# Patient Record
Sex: FEMALE | Race: WHITE
[De-identification: names, ages, dates, MRNs, and addresses within clinical notes are randomized per-mention and may not be internally consistent; named-entity substitution may affect disease eponyms.]

---

## 2017-06-23 ENCOUNTER — HOSPITAL ENCOUNTER (OUTPATIENT)
Dept: HOSPITAL 11 - JP.SDS | Age: 62
Discharge: HOME | End: 2017-06-23
Attending: SURGERY
Payer: COMMERCIAL

## 2017-06-23 VITALS — SYSTOLIC BLOOD PRESSURE: 103 MMHG | DIASTOLIC BLOOD PRESSURE: 70 MMHG

## 2017-06-23 DIAGNOSIS — I10: ICD-10-CM

## 2017-06-23 DIAGNOSIS — K21.9: ICD-10-CM

## 2017-06-23 DIAGNOSIS — F41.9: ICD-10-CM

## 2017-06-23 DIAGNOSIS — Z91.040: ICD-10-CM

## 2017-06-23 DIAGNOSIS — F32.9: ICD-10-CM

## 2017-06-23 DIAGNOSIS — R13.13: Primary | ICD-10-CM

## 2017-06-23 DIAGNOSIS — E66.9: ICD-10-CM

## 2017-06-23 DIAGNOSIS — Z98.84: ICD-10-CM

## 2017-06-23 DIAGNOSIS — Z88.8: ICD-10-CM

## 2017-06-23 DIAGNOSIS — Z91.018: ICD-10-CM

## 2017-06-23 DIAGNOSIS — J45.909: ICD-10-CM

## 2017-06-23 DIAGNOSIS — K28.9: ICD-10-CM

## 2017-06-23 PROCEDURE — 43239 EGD BIOPSY SINGLE/MULTIPLE: CPT

## 2017-06-23 PROCEDURE — 87081 CULTURE SCREEN ONLY: CPT

## 2017-06-26 NOTE — OR
DATE OF PROCEDURE:  06/23/2017

 

PREOPERATIVE DIAGNOSIS:  Laryngopharyngeal dysphagia.

 

POSTOPERATIVE DIAGNOSES:

1. Laryngopharyngeal dysphagia with no significant inflammation involving the pharynx,

    larynx, or upper esophageal sphincter.

2. Small marginal ulcer.

 

OPERATIVE PROCEDURE:  Upper GI endoscopy with biopsies of gastric pouch for CLOtest.

 

ANESTHESIA:  IV sedation.

 

INDICATION FOR PROCEDURE:  This is a 61-year-old status post Jhony-en-Y gastric bypass in

June 2011.  She presents now with some dysphagia which she points to being in her neck and

uppermost chest area.  The plan is to proceed with upper GI endoscopy with biopsies and/or

dilation as indicated.  Potential risks including bleeding and perforation were discussed,

and the patient wishes to proceed.

 

DETAILS OF PROCEDURE:  The patient was taken to the operating room and placed in a left

lateral decubitus position.  IV sedation was administered, after which the upper GI

endoscope was passed orally through the length of the esophagus into the gastric pouch, from

there through the gastrojejunostomy roughly 20 cm into the Jhony limb.

 

FINDINGS:  Included a normal hypopharynx, larynx, upper esophageal sphincter, and esophageal

body at the EG junction.  No significant abnormalities were noted.  She had a very small

pouch.  Just at the edge of the pouch distally, there was a small marginal ulcer with some

white plaque over that consistent with some fibrinous exudate.  No bleeding was seen.  There

was no stricturing and relatively little array of edema otherwise at the gastrojejunostomy.

The visualized portion of the Jhony limb was unremarkable.  Biopsies were then obtained from

the gastric pouch, sent for CLOtest for H. pylori.  Minimal bleeding from the biopsy sites

was seen and the procedure then concluded.

 

The patient is already on omeprazole 40 mg a day.  Given this, I think we will try her on

some liquid Carafate 500 mg q.i.d.  It would need to be liquid as a pill form would pass

beyond the targeted area, i.e. the area around the gastric pouch prior to it dissolving.

 

The patient's other problem at this point from an abdominal standpoint is quite a bit in the

way of bloating.  She has a fair bit of postprandial bloating especially.  This would be

suggestive of partial small bowel obstruction and the plan will be to obtain a CT scan of

the abdomen and pelvis on Wednesday 07/05 (I am out of town next week), and we will see her

that

same day to see if there is any indication for diagnostic laparoscopy with possible lysis of

adhesions and/or correction of  a partial small bowel obstruction per se.

 

 

 

 

Camden Birmingham MD

DD:  06/23/2017 11:29:38

DT:  06/23/2017 14:17:56

Job #:  [id number]

## 2018-06-08 ENCOUNTER — HOSPITAL ENCOUNTER (OUTPATIENT)
Dept: HOSPITAL 11 - JP.SDS | Age: 63
Discharge: HOME | End: 2018-06-08
Attending: SURGERY
Payer: COMMERCIAL

## 2018-06-08 VITALS — SYSTOLIC BLOOD PRESSURE: 123 MMHG | DIASTOLIC BLOOD PRESSURE: 72 MMHG

## 2018-06-08 DIAGNOSIS — Z88.5: ICD-10-CM

## 2018-06-08 DIAGNOSIS — R13.10: Primary | ICD-10-CM

## 2018-06-08 DIAGNOSIS — Z91.040: ICD-10-CM

## 2018-06-08 DIAGNOSIS — Z88.6: ICD-10-CM

## 2018-06-08 DIAGNOSIS — Z91.030: ICD-10-CM

## 2018-06-08 DIAGNOSIS — Z91.048: ICD-10-CM

## 2018-06-08 DIAGNOSIS — Z88.8: ICD-10-CM

## 2018-06-08 DIAGNOSIS — Z91.018: ICD-10-CM

## 2018-06-08 PROCEDURE — 43245 EGD DILATE STRICTURE: CPT

## 2018-06-12 NOTE — OR
DATE OF PROCEDURE:  06/08/2018

 

PREOPERATIVE DIAGNOSIS:  Dysphagia, status post Jhony-en-Y gastric bypass.

 

POSTOPERATIVE DIAGNOSIS:  Normal upper GI endoscopic examination, status post Jhony-en-Y

gastric bypass.

 

OPERATIVE PROCEDURE:  Upper GI endoscopy with dilation of gastrojejunostomy, (minimal change

seen after dilation), (38503).

 

ANESTHESIA:  IV sedation.

 

INDICATIONS FOR PROCEDURE:  This is a 62-year-old, status post previous Jhony-en-Y gastric

bypass presenting with some dysphagia referable to the area of the gastrojejunostomy.  The

plan was to proceed with upper GI endoscopy with biopsies and/or dilation as indicated.

Potential risks including bleeding and perforation were discussed, and the patient wishes to

proceed.

 

DESCRIPTION OF PROCEDURE:  The patient was taken to the operating room and placed on the

left lateral decubitus position.  IV sedation was administered after which the upper GI

endoscope was passed orally through the length of the esophagus into the gastric pouch and

from there through the gastrojejunostomy roughly 20 cm into the Jhony limb.  Overall, the

examination at this point was entirely normal.  There were no areas of inflammation or

stricturing.  The gastrojejunostomy was widely opened.  We did deploy a 54-Spanish balloon

dilator which is a largest size available and this resulted in essentially no additional

luminal dimension as this was already quite wide opened.  At that point, the scope was

withdrawn and the procedure then concluded.

 

The patient may be having some esophageal spasm and recently been using Levsin 0.125 mg

sublingually p.r.n. for these symptoms with some success and she was given a prescription

for that to be following with [facility] Du in 1 month and if she has additional GI problems

in the interim, she was instructed to contact either our program in [place] or Dr. Rosales in [place].

 

 

 

 

Camden Birmingham MD

DD:  06/12/2018 07:00:15

DT:  06/12/2018 07:44:25

Job #:  [id number]

## 2023-08-07 ENCOUNTER — HOSPITAL ENCOUNTER (OUTPATIENT)
Dept: HOSPITAL 11 - JP.SDS | Age: 68
Discharge: HOME | End: 2023-08-07
Attending: SURGERY
Payer: MEDICARE

## 2023-08-07 VITALS — SYSTOLIC BLOOD PRESSURE: 130 MMHG | HEART RATE: 63 BPM | DIASTOLIC BLOOD PRESSURE: 55 MMHG

## 2023-08-07 DIAGNOSIS — Z88.5: ICD-10-CM

## 2023-08-07 DIAGNOSIS — R14.0: Primary | ICD-10-CM

## 2023-08-07 DIAGNOSIS — R10.13: ICD-10-CM

## 2023-08-07 DIAGNOSIS — Z98.84: ICD-10-CM

## 2023-08-07 DIAGNOSIS — Z88.8: ICD-10-CM

## 2023-08-07 DIAGNOSIS — Z91.040: ICD-10-CM

## 2023-08-07 PROCEDURE — 43235 EGD DIAGNOSTIC BRUSH WASH: CPT

## 2023-08-15 ENCOUNTER — HOSPITAL ENCOUNTER (INPATIENT)
Dept: HOSPITAL 11 - JP.SDSSCHI | Age: 68
LOS: 3 days | Discharge: HOME | DRG: 327 | End: 2023-08-18
Attending: SURGERY | Admitting: SURGERY
Payer: MEDICARE

## 2023-08-15 DIAGNOSIS — Z88.5: ICD-10-CM

## 2023-08-15 DIAGNOSIS — K21.9: ICD-10-CM

## 2023-08-15 DIAGNOSIS — K56.51: Primary | ICD-10-CM

## 2023-08-15 DIAGNOSIS — R79.89: ICD-10-CM

## 2023-08-15 DIAGNOSIS — Z88.6: ICD-10-CM

## 2023-08-15 DIAGNOSIS — Z79.899: ICD-10-CM

## 2023-08-15 DIAGNOSIS — Z20.822: ICD-10-CM

## 2023-08-15 DIAGNOSIS — Z91.030: ICD-10-CM

## 2023-08-15 DIAGNOSIS — G47.33: ICD-10-CM

## 2023-08-15 DIAGNOSIS — C64.2: ICD-10-CM

## 2023-08-15 DIAGNOSIS — Z90.49: ICD-10-CM

## 2023-08-15 DIAGNOSIS — G43.909: ICD-10-CM

## 2023-08-15 DIAGNOSIS — J45.909: ICD-10-CM

## 2023-08-15 DIAGNOSIS — Z87.19: ICD-10-CM

## 2023-08-15 DIAGNOSIS — Z91.09: ICD-10-CM

## 2023-08-15 DIAGNOSIS — D17.5: ICD-10-CM

## 2023-08-15 DIAGNOSIS — N18.31: ICD-10-CM

## 2023-08-15 DIAGNOSIS — Z96.653: ICD-10-CM

## 2023-08-15 DIAGNOSIS — Z98.49: ICD-10-CM

## 2023-08-15 DIAGNOSIS — E78.00: ICD-10-CM

## 2023-08-15 DIAGNOSIS — I12.9: ICD-10-CM

## 2023-08-15 DIAGNOSIS — F32.9: ICD-10-CM

## 2023-08-15 DIAGNOSIS — Z99.81: ICD-10-CM

## 2023-08-15 DIAGNOSIS — N28.1: ICD-10-CM

## 2023-08-15 DIAGNOSIS — Z97.8: ICD-10-CM

## 2023-08-15 DIAGNOSIS — Z79.82: ICD-10-CM

## 2023-08-15 DIAGNOSIS — Z98.84: ICD-10-CM

## 2023-08-15 DIAGNOSIS — Z91.040: ICD-10-CM

## 2023-08-15 DIAGNOSIS — Z47.89: ICD-10-CM

## 2023-08-15 DIAGNOSIS — E11.65: ICD-10-CM

## 2023-08-15 DIAGNOSIS — E11.40: ICD-10-CM

## 2023-08-15 DIAGNOSIS — Z87.891: ICD-10-CM

## 2023-08-15 DIAGNOSIS — C64.1: ICD-10-CM

## 2023-08-15 DIAGNOSIS — D63.1: ICD-10-CM

## 2023-08-15 DIAGNOSIS — E53.8: ICD-10-CM

## 2023-08-15 DIAGNOSIS — G47.00: ICD-10-CM

## 2023-08-15 DIAGNOSIS — Z91.02: ICD-10-CM

## 2023-08-15 PROCEDURE — C9113 INJ PANTOPRAZOLE SODIUM, VIA: HCPCS

## 2023-08-15 PROCEDURE — 3E0M05Z INTRODUCTION OF ADHESION BARRIER INTO PERITONEAL CAVITY, OPEN APPROACH: ICD-10-PCS | Performed by: SURGERY

## 2023-08-15 PROCEDURE — 0D160ZA BYPASS STOMACH TO JEJUNUM, OPEN APPROACH: ICD-10-PCS | Performed by: SURGERY

## 2023-08-15 PROCEDURE — 0WBF0ZX EXCISION OF ABDOMINAL WALL, OPEN APPROACH, DIAGNOSTIC: ICD-10-PCS | Performed by: SURGERY

## 2023-08-15 PROCEDURE — 0DQ80ZZ REPAIR SMALL INTESTINE, OPEN APPROACH: ICD-10-PCS | Performed by: SURGERY

## 2023-08-15 PROCEDURE — U0002 COVID-19 LAB TEST NON-CDC: HCPCS

## 2023-08-15 PROCEDURE — 5A09357 ASSISTANCE WITH RESPIRATORY VENTILATION, LESS THAN 24 CONSECUTIVE HOURS, CONTINUOUS POSITIVE AIRWAY PRESSURE: ICD-10-PCS | Performed by: SURGERY

## 2023-08-15 RX ADMIN — Medication PRN MG: at 11:12

## 2023-08-15 RX ADMIN — INSULIN LISPRO SCH: 100 INJECTION, SOLUTION INTRAVENOUS; SUBCUTANEOUS at 16:03

## 2023-08-15 RX ADMIN — SODIUM CHLORIDE, SODIUM LACTATE, POTASSIUM CHLORIDE, AND CALCIUM CHLORIDE SCH MLS/HR: .6; .31; .03; .02 INJECTION, SOLUTION INTRAVENOUS at 18:40

## 2023-08-15 RX ADMIN — INSULIN LISPRO SCH: 100 INJECTION, SOLUTION INTRAVENOUS; SUBCUTANEOUS at 22:07

## 2023-08-15 RX ADMIN — SODIUM FERRIC GLUCONATE COMPLEX SCH MLS/HR: 12.5 INJECTION INTRAVENOUS at 14:02

## 2023-08-16 LAB
ALBUMIN SERPL-MCNC: 2.6 G/DL (ref 3.4–5)
ALBUMIN/GLOB SERPL: 1 {RATIO} (ref 1.2–2.2)
ALP SERPL-CCNC: 176 U/L (ref 46–116)
ALT SERPL-CCNC: 28 U/L (ref 12–78)
ANION GAP SERPL CALC-SCNC: 6.7 MMOL/L (ref 5–14)
AST SERPL-CCNC: 21 U/L (ref 15–37)
BASOPHILS # BLD AUTO: 0.02 K/UL (ref 0–0.1)
BASOPHILS NFR BLD AUTO: 0.1 % (ref 0.1–1.3)
BILIRUB SERPL-MCNC: 0.4 MG/DL (ref 0.2–1)
BNP SERPL-MCNC: 635 PG/ML (ref 5–125)
BUN SERPL-MCNC: 14 MG/DL (ref 7–18)
CALCIUM SERPL-MCNC: 8.1 MG/DL (ref 8.5–10.1)
CHLORIDE SERPL-SCNC: 106 MMOL/L (ref 100–108)
CO2 SERPL-SCNC: 27 MMOL/L (ref 21–32)
CREAT CL 24H UR+SERPL-VRATE: 37.6 ML/MIN
CREAT SERPL-MCNC: 1 MG/DL (ref 0.6–1)
EOSINOPHIL # BLD AUTO: 0 K/UL (ref 0–0.4)
EOSINOPHIL NFR BLD AUTO: 0 % (ref 0–5.4)
GLOBULIN SER-MCNC: 2.7 G/DL (ref 2.3–3.5)
GLUCOSE SERPL-MCNC: 128 MG/DL (ref 74–106)
HCT VFR BLD AUTO: 36.4 % (ref 34.3–46)
HGB BLD-MCNC: 11.9 G/DL (ref 11.2–15.5)
IMM GRANULOCYTES # BLD: 0.07 K/UL (ref 0–0.23)
IMM GRANULOCYTES NFR BLD: 0.5 % (ref 0–0.7)
LYMPHOCYTES # BLD AUTO: 1.01 K/UL (ref 0.8–3.3)
LYMPHOCYTES NFR BLD AUTO: 7.3 % (ref 11.4–47.7)
MAGNESIUM SERPL-MCNC: 1.9 MG/DL (ref 1.8–2.4)
MCH RBC QN AUTO: 29.8 PG (ref 31.6–35.5)
MCHC RBC AUTO-ENTMCNC: 32.7 G/DL (ref 31.6–35.5)
MCHC RBC AUTO-ENTMCNC: 91 FL (ref 81.4–99)
MONOCYTES # BLD AUTO: 0.82 K/UL (ref 0.2–0.9)
MONOCYTES NFR BLD AUTO: 5.9 % (ref 3.3–12.6)
NEUTROPHILS # BLD AUTO: 11.99 K/UL (ref 1–7.6)
NEUTROPHILS NFR BLD AUTO: 86.2 % (ref 40–78.1)
PHOSPHATE SERPL-MCNC: 4.9 MG/DL (ref 2.5–4.9)
PLATELET # BLD AUTO: 247 K/UL (ref 130–375)
POTASSIUM SERPL-SCNC: 4.4 MMOL/L (ref 3.6–5.2)
PROT SERPL-MCNC: 5.3 G/DL (ref 6.4–8.2)
RBC # BLD AUTO: 4 M/UL (ref 3.77–5.24)
SODIUM SERPL-SCNC: 140 MMOL/L (ref 140–148)
WBC # BLD AUTO: 13.9 K/UL (ref 3.2–11)

## 2023-08-16 RX ADMIN — SODIUM CHLORIDE, SODIUM LACTATE, POTASSIUM CHLORIDE, AND CALCIUM CHLORIDE SCH MLS/HR: .6; .31; .03; .02 INJECTION, SOLUTION INTRAVENOUS at 18:15

## 2023-08-16 RX ADMIN — INSULIN LISPRO SCH: 100 INJECTION, SOLUTION INTRAVENOUS; SUBCUTANEOUS at 04:33

## 2023-08-16 RX ADMIN — INSULIN LISPRO SCH: 100 INJECTION, SOLUTION INTRAVENOUS; SUBCUTANEOUS at 21:19

## 2023-08-16 RX ADMIN — Medication SCH: at 08:58

## 2023-08-16 RX ADMIN — INSULIN LISPRO SCH: 100 INJECTION, SOLUTION INTRAVENOUS; SUBCUTANEOUS at 16:49

## 2023-08-16 RX ADMIN — INSULIN LISPRO SCH: 100 INJECTION, SOLUTION INTRAVENOUS; SUBCUTANEOUS at 09:48

## 2023-08-16 RX ADMIN — Medication PRN MG: at 19:34

## 2023-08-16 RX ADMIN — SODIUM FERRIC GLUCONATE COMPLEX SCH MLS/HR: 12.5 INJECTION INTRAVENOUS at 13:58

## 2023-08-17 LAB
ALBUMIN SERPL-MCNC: 2.4 G/DL (ref 3.4–5)
ALBUMIN/GLOB SERPL: 0.9 {RATIO} (ref 1.2–2.2)
ALP SERPL-CCNC: 147 U/L (ref 46–116)
ALT SERPL-CCNC: 29 U/L (ref 12–78)
ANION GAP SERPL CALC-SCNC: 5.1 MMOL/L (ref 5–14)
AST SERPL-CCNC: 32 U/L (ref 15–37)
BILIRUB SERPL-MCNC: 0.2 MG/DL (ref 0.2–1)
BNP SERPL-MCNC: 583 PG/ML (ref 5–125)
BUN SERPL-MCNC: 13 MG/DL (ref 7–18)
CALCIUM SERPL-MCNC: 7.8 MG/DL (ref 8.5–10.1)
CHLORIDE SERPL-SCNC: 106 MMOL/L (ref 100–108)
CO2 SERPL-SCNC: 29 MMOL/L (ref 21–32)
CREAT CL 24H UR+SERPL-VRATE: 37.6 ML/MIN
CREAT SERPL-MCNC: 1 MG/DL (ref 0.6–1)
GLOBULIN SER-MCNC: 2.7 G/DL (ref 2.3–3.5)
GLUCOSE SERPL-MCNC: 90 MG/DL (ref 74–106)
HCT VFR BLD AUTO: 33.2 % (ref 34.3–46)
HGB BLD-MCNC: 10.8 G/DL (ref 11.2–15.5)
MAGNESIUM SERPL-MCNC: 1.8 MG/DL (ref 1.8–2.4)
MCH RBC QN AUTO: 29.8 PG (ref 31.6–35.5)
MCHC RBC AUTO-ENTMCNC: 32.5 G/DL (ref 31.6–35.5)
MCHC RBC AUTO-ENTMCNC: 91.5 FL (ref 81.4–99)
PHOSPHATE SERPL-MCNC: 3.5 MG/DL (ref 2.5–4.9)
PLATELET # BLD AUTO: 198 K/UL (ref 130–375)
POTASSIUM SERPL-SCNC: 4.2 MMOL/L (ref 3.6–5.2)
PROT SERPL-MCNC: 5.1 G/DL (ref 6.4–8.2)
RBC # BLD AUTO: 3.63 M/UL (ref 3.77–5.24)
SODIUM SERPL-SCNC: 140 MMOL/L (ref 140–148)
WBC # BLD AUTO: 9.9 K/UL (ref 3.2–11)

## 2023-08-17 RX ADMIN — INSULIN LISPRO SCH: 100 INJECTION, SOLUTION INTRAVENOUS; SUBCUTANEOUS at 17:09

## 2023-08-17 RX ADMIN — INSULIN LISPRO SCH: 100 INJECTION, SOLUTION INTRAVENOUS; SUBCUTANEOUS at 04:41

## 2023-08-17 RX ADMIN — Medication SCH: at 09:00

## 2023-08-17 RX ADMIN — INSULIN LISPRO SCH: 100 INJECTION, SOLUTION INTRAVENOUS; SUBCUTANEOUS at 21:39

## 2023-08-17 RX ADMIN — INSULIN LISPRO SCH: 100 INJECTION, SOLUTION INTRAVENOUS; SUBCUTANEOUS at 10:44

## 2023-08-18 VITALS — SYSTOLIC BLOOD PRESSURE: 139 MMHG | HEART RATE: 80 BPM | DIASTOLIC BLOOD PRESSURE: 75 MMHG

## 2023-08-18 LAB
ALBUMIN SERPL-MCNC: 2.4 G/DL (ref 3.4–5)
ALBUMIN/GLOB SERPL: 0.8 {RATIO} (ref 1.2–2.2)
ALP SERPL-CCNC: 147 U/L (ref 46–116)
ALT SERPL-CCNC: 29 U/L (ref 12–78)
ANION GAP SERPL CALC-SCNC: 9.1 MMOL/L (ref 5–14)
AST SERPL-CCNC: 32 U/L (ref 15–37)
BILIRUB SERPL-MCNC: 0.5 MG/DL (ref 0.2–1)
BNP SERPL-MCNC: 505 PG/ML (ref 5–125)
BUN SERPL-MCNC: 10 MG/DL (ref 7–18)
CALCIUM SERPL-MCNC: 8.2 MG/DL (ref 8.5–10.1)
CHLORIDE SERPL-SCNC: 105 MMOL/L (ref 100–108)
CO2 SERPL-SCNC: 33 MMOL/L (ref 21–32)
CREAT CL 24H UR+SERPL-VRATE: 37.6 ML/MIN
CREAT SERPL-MCNC: 1 MG/DL (ref 0.6–1)
GLOBULIN SER-MCNC: 3 G/DL (ref 2.3–3.5)
GLUCOSE SERPL-MCNC: 82 MG/DL (ref 74–106)
HCT VFR BLD AUTO: 36 % (ref 34.3–46)
HGB BLD-MCNC: 11.7 G/DL (ref 11.2–15.5)
MAGNESIUM SERPL-MCNC: 1.8 MG/DL (ref 1.8–2.4)
MCH RBC QN AUTO: 29.8 PG (ref 31.6–35.5)
MCHC RBC AUTO-ENTMCNC: 32.5 G/DL (ref 31.6–35.5)
MCHC RBC AUTO-ENTMCNC: 91.8 FL (ref 81.4–99)
PHOSPHATE SERPL-MCNC: 3.5 MG/DL (ref 2.5–4.9)
PLATELET # BLD AUTO: 218 K/UL (ref 130–375)
POTASSIUM SERPL-SCNC: 4.1 MMOL/L (ref 3.6–5.2)
PROT SERPL-MCNC: 5.4 G/DL (ref 6.4–8.2)
RBC # BLD AUTO: 3.92 M/UL (ref 3.77–5.24)
SODIUM SERPL-SCNC: 143 MMOL/L (ref 140–148)
WBC # BLD AUTO: 7.4 K/UL (ref 3.2–11)

## 2023-08-18 RX ADMIN — INSULIN LISPRO SCH: 100 INJECTION, SOLUTION INTRAVENOUS; SUBCUTANEOUS at 10:08

## 2023-08-18 RX ADMIN — INSULIN LISPRO SCH: 100 INJECTION, SOLUTION INTRAVENOUS; SUBCUTANEOUS at 04:27
